# Patient Record
Sex: MALE | Race: BLACK OR AFRICAN AMERICAN | NOT HISPANIC OR LATINO | Employment: OTHER | URBAN - METROPOLITAN AREA
[De-identification: names, ages, dates, MRNs, and addresses within clinical notes are randomized per-mention and may not be internally consistent; named-entity substitution may affect disease eponyms.]

---

## 2023-10-18 ENCOUNTER — SPECIALTY PHARMACY (OUTPATIENT)
Dept: PHARMACY | Facility: CLINIC | Age: 68
End: 2023-10-18

## 2023-10-18 ENCOUNTER — PHARMACY VISIT (OUTPATIENT)
Dept: PHARMACY | Facility: CLINIC | Age: 68
End: 2023-10-18
Payer: MEDICARE

## 2023-10-18 PROCEDURE — RXMED WILLOW AMBULATORY MEDICATION CHARGE

## 2023-11-27 ENCOUNTER — SPECIALTY PHARMACY (OUTPATIENT)
Dept: PHARMACY | Facility: CLINIC | Age: 68
End: 2023-11-27

## 2023-11-28 ENCOUNTER — PHARMACY VISIT (OUTPATIENT)
Dept: PHARMACY | Facility: CLINIC | Age: 68
End: 2023-11-28
Payer: MEDICARE

## 2023-11-28 PROCEDURE — RXMED WILLOW AMBULATORY MEDICATION CHARGE

## 2023-12-19 PROCEDURE — RXMED WILLOW AMBULATORY MEDICATION CHARGE

## 2023-12-20 ENCOUNTER — PHARMACY VISIT (OUTPATIENT)
Dept: PHARMACY | Facility: CLINIC | Age: 68
End: 2023-12-20
Payer: MEDICARE

## 2023-12-21 ENCOUNTER — PHARMACY VISIT (OUTPATIENT)
Dept: PHARMACY | Facility: CLINIC | Age: 68
End: 2023-12-21

## 2024-01-04 RX ORDER — TADALAFIL 5 MG/1
TABLET ORAL
COMMUNITY
Start: 2023-10-05

## 2024-01-04 RX ORDER — MULTIVITAMIN
TABLET ORAL
COMMUNITY

## 2024-01-04 RX ORDER — METFORMIN HYDROCHLORIDE 500 MG/1
1 TABLET ORAL 2 TIMES DAILY
COMMUNITY
Start: 2020-06-15

## 2024-01-04 RX ORDER — SEMAGLUTIDE 1.34 MG/ML
0.25 INJECTION, SOLUTION SUBCUTANEOUS WEEKLY
COMMUNITY

## 2024-01-04 RX ORDER — CHOLECALCIFEROL (VITAMIN D3) 25 MCG
TABLET ORAL
COMMUNITY

## 2024-01-04 RX ORDER — TAMSULOSIN HYDROCHLORIDE 0.4 MG/1
1 CAPSULE ORAL DAILY
COMMUNITY
Start: 2020-06-15

## 2024-01-08 ENCOUNTER — SPECIALTY PHARMACY (OUTPATIENT)
Dept: PHARMACY | Facility: CLINIC | Age: 69
End: 2024-01-08

## 2024-01-08 PROBLEM — E66.9 OBESITY (BMI 30-39.9): Status: ACTIVE | Noted: 2024-01-08

## 2024-01-08 PROBLEM — E11.9 DIABETES MELLITUS TYPE II, NON INSULIN DEPENDENT (MULTI): Status: ACTIVE | Noted: 2024-01-08

## 2024-01-08 PROBLEM — G47.33 OSA (OBSTRUCTIVE SLEEP APNEA): Status: ACTIVE | Noted: 2024-01-08

## 2024-01-08 PROBLEM — E78.5 DYSLIPIDEMIA: Status: ACTIVE | Noted: 2024-01-08

## 2024-01-08 PROBLEM — I10 ESSENTIAL HYPERTENSION, BENIGN: Status: ACTIVE | Noted: 2024-01-08

## 2024-01-08 NOTE — PROGRESS NOTES
[facility] Card[facility]    Patient Name: Ned Veronica  Patient : 1955      Scribe Attestation  By signing my name below, ICarolina Scribe   attest that this documentation has been prepared under the direction and in the presence of Vikas Early DO.      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  The patient was last evaluated by me in 2023.  At that visit he was doing well and asked that he follow-up in 1 year. Outside blood work done on 11/3/23 showed lipid panel with an  LDL of 81, and  triglycerides of 72 while on Praluent 75 mg subQ every 2 weeks. CMP showed serum sodium of 139, serum potassium 3.6 and serum creatinine of 1.2. CBC showed a hemoglobin of 14.7. ECG done today showed sinus rhythm with a heart rate of 69 bpm. The patient reports that he has been feeling generally well from the cardiac standpoint. He denies any new chest pain, shortness of breath, palpitations and lightheadedness.  He states that he does not monitor his blood pressure at home. He states that he takes all of his medications as prescribed. During my exam, he was resting comfortably on the exam table.           Assessment/Plan:   1. 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  Outside lipid panel done on 11/3/23 showed an LDL of 81, and  triglycerides of 72 while on Praluent 75 mg subQ every 2 weeks.  Patient requesting to be transition to Repatha secondary to insurance coverage.  Will discontinue Praluent and start  Repatha 140 mg subcu every 2 weeks.  Please see lifestyle recommendations below.  Follow up in 1 year and sooner if necessary.      2. Hypertension  Patient has a history of hypertension which appears moderately controlled on exam today.  ECG done today showed sinus rhythm with a heart rate of 69 bpm.   He denies chest pain, headache or lightheadedness.  I will start him on amlodipine 2.5 mg daily.   He should continue his other antihypertensive medications.  She should monitor his blood pressure at home and call the clinic if his blood pressure is persistently elevated.   Continue risk factor modification.     3. Diabetes mellitus  Medication management per primary care provider.     4. Obstructive sleep apnea  Stable, patient on CPAP therapy.  Management as per PCP.     5. Obesity  Please see lifestyle recommendations below.    6. Lower extremity edema  The patient has trivial-1+ pitting edema on the right and 1+ on the left.  I discussed with him the importance of following a low-sodium heart healthy diet as well as weight loss, wearing compression stockings and elevating legs when seated.     Orders:  Discontinue Praluent and start Repatha 140 mg subcu every 2 weeks.  Start amlodipine 2.5 mg daily.   Follow-up in 1 year.    Lifestyle Recommendations  I recommend a whole-food plant-based diet, an eating pattern that encourages the consumption of unrefined plant foods (such as fruits, vegetables, tubers, whole grains, legumes, nuts and seeds) and discourages meats, dairy products, eggs and processed foods.     The AHA/ACC recommends that the patient consume a dietary pattern that emphasizes intake of vegetables, fruits, and whole grains; includes low-fat dairy products, poultry, fish, legumes, non-tropical vegetable oils, and nuts; and limits intake of sodium, sweets, sugar-sweetened beverages, and red meats.  Adapt this dietary pattern to appropriate calorie requirements (a 500-750 kcal/day deficit to loose  weight), personal and cultural food preferences, and nutrition therapy for other medical conditions (including diabetes).  Achieve this pattern by following plans such as the [place] Mediterranean, DASH dietary pattern, or AHA diet.     Engage in 2 hours and 30 minutes per week of moderate-intensity physical activity, or 1 hour and 15 minutes (75 minutes) per week of vigorous-intensity aerobic physical activity, or an equivalent combination of moderate and vigorous-intensity aerobic physical activity. Aerobic activity should be performed in episodes of at least 10 minutes preferably spread throughout the week.     Adhering to a heart healthy diet, regular exercise habits, avoidance of tobacco products, and maintenance of a healthy weight are crucial components of their heart disease risk reduction.     Any positive review of systems not specifically addressed in the office visit today should be evaluated and treated by the patients primary care physician or in an emergency department if necessary     Patient was notified that results from ordered tests will be called to the patient if it changes current management; it will otherwise be discussed at a future appointment and available on  [employer].     Thank you for allowing me to participate in the care of this patient.        This document was generated using the assistance of voice recognition software. If there are any errors of spelling, grammar, syntax, or meaning; please feel free to contact me directly for clarification.    Past Medical History:  He has a past medical history of Encounter for screening for malignant neoplasm of colon (06/15/2020), Flat foot (pes planus) (acquired), unspecified foot, Gastro-esophageal reflux disease without esophagitis, Obstructive sleep apnea (adult) (pediatric), Pain in right hip (08/15/2016), Personal history of colonic polyps, Personal history of other diseases of the digestive system, Personal history of other specified  conditions (06/14/2017), Personal history of peptic ulcer disease, Segmental and somatic dysfunction of lumbar region (08/15/2016), Segmental and somatic dysfunction of pelvic region (08/15/2016), Segmental and somatic dysfunction of sacral region (08/15/2016), and Segmental and somatic dysfunction of thoracic region (08/15/2016).    Past Surgical History:  He has a past surgical history that includes Other surgical history (06/26/2020); Other surgical history (06/26/2020); Other surgical history (06/26/2020); Other surgical history (06/26/2020); and Other surgical history (06/26/2020).      Social History:  He has no history on file for tobacco use, alcohol use, and drug use.    Family History:  No family history on file.     Allergies:  Patient has no allergy information on record.    Outpatient Medications:  Current Outpatient Medications   Medication Instructions    alirocumab 75 mg/mL pen injector INJECT 1 PEN (75 MG) UNDER THE SKIN ONCE EVERY 2 WEEKS    cholecalciferol (Vitamin D-3) 25 MCG (1000 UT) tablet oral    FreeStyle Vicky sensor system kit USE AS DIRECTED, CHANGE SENSOR EVERY 2 WEEKS    L. acidophilus/Bifid. animalis 32 billion cell capsule 1 capsule, oral, Daily    metFORMIN (Glucophage) 500 mg tablet 1 tablet, oral, 2 times daily    multivitamin tablet oral    Ozempic 0.25 mg, subcutaneous, Weekly    tadalafil (Cialis) 5 mg tablet Take one pill daily for urinary complaints    tamsulosin (Flomax) 0.4 mg 24 hr capsule 1 capsule, oral, Daily    ubidecarenone (COENZYME Q10, BULK, MISC) oral        ROS:  A 14 point review of systems was done and is negative other than as stated in HPI    Vitals:      6/15/2020     9:00 AM 6/24/2020     9:20 AM 7/29/2020    12:38 PM 1/19/2021     1:35 PM 7/20/2021    12:37 PM 1/20/2022    12:37 PM 1/19/2023    11:31 AM   Vitals   Systolic 120  128 120 122 142 126   Diastolic 80  70 62 70 84 80   Heart Rate 57  55 56 68 53 64   Resp   16   16    Height (in) 1.708 m (5'  "7.25\") 1.701 m (5' 6.97\") 1.708 m (5' 7.25\") 1.715 m (5' 7.5\") 1.715 m (5' 7.5\") 1.715 m (5' 7.5\") 1.715 m (5' 7.5\")   Weight (lb) 239.2 234.35 232 230 231.13 230 237   BMI 37.19 kg/m2 36.74 kg/m2 36.07 kg/m2 35.49 kg/m2 35.66 kg/m2 35.49 kg/m2 36.57 kg/m2   BSA (m2) 2.27 m2 2.24 m2 2.23 m2 2.23 m2 2.24 m2 2.23 m2 2.27 m2        Physical Exam:   Constitutional: Cooperative, in no acute distress, alert, appears stated age.  Skin: Skin color, texture, turgor normal. No rashes or lesions.  Head: Normocephalic. No masses, lesions, tenderness or abnormalities  Eyes: Extraocular movements are grossly intact.  Mouth and throat: Mucous membranes moist  Neck: Neck supple, no carotid bruits, no JVD  Respiratory: Lungs clear to auscultation, no wheezing or rhonchi, no use of accessory muscles  Chest wall: No scars, normal excursion with respiration  Cardiovascular: Regular rhythm without murmur, gallop, or rubs  Gastrointestinal: Abdomen soft, nontender. Bowel sounds normal. Obese  Musculoskeletal: Strength equal in upper extremities  Extremities: Trivial-1+ pitting edema on the right and 1+ on the left  Neurologic: Sensation grossly intact, alert and oriented x3      Intake/Output:   No intake/output data recorded.    Outpatient Medications  Current Outpatient Medications on File Prior to Visit   Medication Sig Dispense Refill    alirocumab 75 mg/mL pen injector INJECT 1 PEN (75 MG) UNDER THE SKIN ONCE EVERY 2 WEEKS 2 mL 11    cholecalciferol (Vitamin D-3) 25 MCG (1000 UT) tablet Take by mouth.      FreeStyle Vicky sensor system kit USE AS DIRECTED, CHANGE SENSOR EVERY 2 WEEKS 2 each 11    L. acidophilus/Bifid. animalis 32 billion cell capsule Take 1 capsule by mouth once daily.      metFORMIN (Glucophage) 500 mg tablet Take 1 tablet (500 mg) by mouth 2 times a day.      multivitamin tablet Take by mouth.      semaglutide (Ozempic) 0.25 mg or 0.5 mg(2 mg/1.5 mL) pen injector Inject 0.25 mg under the skin once a week.      " tadalafil (Cialis) 5 mg tablet Take one pill daily for urinary complaints      tamsulosin (Flomax) 0.4 mg 24 hr capsule Take 1 capsule (0.4 mg) by mouth once daily.      ubidecarenone (COENZYME Q10, BULK, MISC) Take by mouth.       No current facility-administered medications on file prior to visit.       Labs: (past 26 weeks)  No results found for this or any previous visit (from the past 4368 hour(s)).    ECG  No results found for this or any previous visit (from the past 4464 hour(s)).    Echocardiogram  No results found for this or any previous visit from the past 1095 days.      CV Studies:  EKG: No results found for this or any previous visit (from the past 4464 hour(s)).  Echocardiogram:   [place] [address]  Phone [phone] Fax [phone]    TRANSTHORACIC ECHOCARDIOGRAM REPORT      Patient Name:     CLAU NGUYEN Reading Physician:   [id number] Giorgio Martins MD  Study Date:       8/20/2020     Referring Physician: [id number] Satinder Huizar MD  MRN/PID:          [id number]      PCP:  Accession/Order#: [id number]  Department Location: [facility]  YOB: 1955     Fellow:  Gender:           M             Nurse:  Admit Date:       8/20/2020     Sonographer:         Debbie Hauser [facility]  Admission Status: Outpatient    Additional Staff:  Height:           170.18 cm     CC Report to:  Weight:           102.51 kg     Study Type:          Echocardiogram  BSA:              2.13 m2  Blood Pressure: 133 /81 mmHg    Diagnosis/ICD: R01.1-Cardiac murmur, unspecified; I50.1-Left ventricular failure  Indication:    Murmur Left ventricular failure  Procedure/CPT: Echo Complete w Full [id number]    Patient History:  Pertinent History: Murmur and Hyperlipidemia.    Study Detail: The following Echo studies were performed: 2D, M-Mode, Doppler and  color flow.      PHYSICIAN INTERPRETATION:  Left Ventricle: The left  ventricular systolic function is normal, with an estimated ejection fraction of 55%. There are no regional wall motion abnormalities. The left ventricular cavity size is normal. Spectral Doppler shows a normal pattern of left ventricular diastolic filling.  Left Atrium: The left atrium is normal in size.  Right Ventricle: The right ventricle is normal in size. There is low normal right ventricular systolic function.  Right Atrium: The right atrium is normal in size.  Aortic Valve: The aortic valve is trileaflet. There is no evidence of aortic valve regurgitation.  Mitral Valve: The mitral valve is normal in structure. There is trace mitral valve regurgitation.  Tricuspid Valve: The tricuspid valve is structurally normal. No evidence of tricuspid regurgitation.  Pulmonic Valve: The pulmonic valve is structurally normal. There is physiologic pulmonic valve regurgitation.  Pericardium: There is no pericardial effusion noted.  Aorta: The aortic root is normal.      CONCLUSIONS:  1. The left ventricular systolic function is normal with a 55% estimated ejection fraction.    QUANTITATIVE DATA SUMMARY:  2D MEASUREMENTS:  Normal Ranges:  Ao Root d:     2.60 cm   (2.0-3.7cm)  IVSd:          1.20 cm   (0.6-1.1cm)  LVPWd:         1.10 cm   (0.6-1.1cm)  LVIDd:         3.90 cm   (3.9-5.9cm)  LVIDs:         3.10 cm  LV Mass Index: 70.2 g/m2  LV % FS        20.5 %    LA VOLUME:  Normal Ranges:  LA Vol A4C:        39.3 ml    (22+/-6mL/m2)  LA Vol A2C:        51.8 ml  LA Vol BP:         46.6 ml  LA Vol Index A4C:  18.4ml/m2  LA Vol Index A2C:  24.3 ml/m2  LA Vol Index BP:   21.9 ml/m2  LA Area A4C:       16.0 cm2  LA Area A2C:       17.8 cm2  LA Major Axis A4C: 5.5 cm  LA Major Axis A2C: 5.2 cm    RA VOLUME BY A/L METHOD:  Normal Ranges:  RA Area A4C: 15.1 cm2    M-MODE MEASUREMENTS:  Normal Ranges:  Ao Root: 2.90 cm (2.0-3.7cm)  AoV Exc: 1.70 cm (1.5-2.5cm)  LAs:     2.50 cm (2.7-4.0cm)    AORTA MEASUREMENTS:  Normal Ranges:  AoV  Exc:     1.70 cm (1.5-2.5cm)  AoV Miracle,s:   2.60 cm (1.4-2.6cm)  Ao Sinus, s: 2.90 cm  Ao STJ, s:   2.70 cm  Asc Ao, d:   2.90 cm (2.1-3.4cm)    LV SYSTOLIC FUNCTION BY 2D PLANIMETRY (MOD):  Normal Ranges:  EF-A4C View: 48.5 % (>55%)  EF-A2C View: 50.5 %  EF-Biplane:  48.9 %    LV DIASTOLIC FUNCTION:  Normal Ranges:  MV Peak E:    0.81 m/s    (0.7-1.2 m/s)  MV Peak A:    0.50 m/s    (0.42-0.7 m/s)  E/A Ratio:    1.62        (1.0-2.2)  MV e'         0.11 m/s    (>8.0)  MV lateral e' 0.12 m/s  MV medial e'  0.10 m/s  MV A Dur:     142.00 msec  E/e' Ratio:   7.35        (<8.0)  LV IVRT:      84 msec     (<100ms)    MITRAL VALVE:  Normal Ranges:  MV DT: 274 msec (150-240msec)    MITRAL INSUFFICIENCY:  Normal Ranges:  MR VTI:       193.00 cm  MR Vmax:      488.00 cm/s  MR Alias Mustapha: 31.6 cm/s  MR Volume:    11.23 ml  MR Flow Rt:   28.40 ml/s  MR EROA:      0.06 cm2    AORTIC VALVE:  Normal Ranges:  LVOT Max Mustapha:  0.89 m/s (<1.1m/s)  LVOT VTI:      22.00 cm  LVOT Diameter: 2.00 cm  (1.8-2.4cm)    RIGHT VENTRICLE:  RV 1   3.7 cm  RV 2   2.8 cm  RV 3   7.0 cm  TAPSE: 18.6 mm  RV s'  0.12 m/s    TRICUSPID VALVE/RVSP:  Normal Ranges:  Peak TR Velocity: 2.11 m/s  RV Syst Pressure: 25.8 mmHg (< 30mmHg)  TV E Vmax:        0.56 m/s  (0.3-0.7m/s)  TV A Vmax:        0.31 m/s  IVC Diam:         2.20 cm      [id number] Giorgio Martins MD  Electronically signed on 8/21/2020 at 12:15:15 PM         Final     Stress Testing IMGRESULT(ILN0014:1:1825):   NM CARDIAC STRESS REST (MYOCARDIAL PERFUSION MIBI) 08/07/2020    Narrative  MRN: [id number]  Patient Name: CLAU NGUYEN    STUDY:  CARDIAC STRESS/REST INJECTION;  8/7/2020 9:30 am    INDICATION:  Abnormal EKG with multiple CAD risk factors.    COMPARISON:  None.    ACCESSION NUMBER(S):  [id number]    ORDERING CLINICIAN:  EMMA PALOMO    TECHNIQUE:  DIVISION OF NUCLEAR MEDICINE  PHARMACOLOGIC STRESS MYOCARDIAL PERFUSION SCAN, ONE DAY PROTOCOL    The patient received an intravenous dose of  10.6  mCi of Tc-99m  tetrofosmin and resting emission tomographic (SPECT) images of the  myocardium were acquired. The patient then received an intravenous  infusion of 0.4mg regadenoson (Lexiscan) followed by an additional  dose of  34.4 mCi of Tc-99m tetrofosmin. Stress phase SPECT images of  the myocardium were then acquired. These included ECG-gated images to  assess and quantify ventricular function.    FINDINGS:  Stress and rest images both demonstrate a normal distribution of  perfusion throughout all LV segments with no sign of ischemia.    ECG-gated images demonstrate normal LV size and mildly reduced  myocardial contractility with an LV ejection fraction of   46 %  (normal above 50 percent).    Impression  Normal stress myocardial perfusion imaging in response to  pharmacologic stress. Mildly reduced left ventricular systolic  function in post-stress gated imaging.    Cardiac Catheterization: No results found for this or any previous visit from the past 1825 days.  No results found for this or any previous visit from the past 3650 days.     Cardiac Scoring: No results found for this or any previous visit from the past 1825 days.    AAA : No results found for this or any previous visit from the past 1825 days.    OTHER: No results found for this or any previous visit from the past 1825 days.    LAST IMAGING RESULTS  ELECTROCARDIOGRAM RHYTHM STRIP  Ordered by an unspecified provider.    Problem List Items Addressed This Visit       Dyslipidemia - Primary    Relevant Medications    evolocumab (Repatha SureClick) 140 mg/mL injection    amLODIPine (Norvasc) 2.5 mg tablet    Other Relevant Orders    ECG 12 Lead (Completed)    Follow Up In Cardiology    Essential hypertension, benign    Relevant Medications    evolocumab (Repatha SureClick) 140 mg/mL injection    amLODIPine (Norvasc) 2.5 mg tablet    Other Relevant Orders    ECG 12 Lead (Completed)    Follow Up In Cardiology    Diabetes mellitus type II, non insulin  dependent (CMS/HCC)    Relevant Medications    evolocumab (Repatha SureClick) 140 mg/mL injection    amLODIPine (Norvasc) 2.5 mg tablet    Other Relevant Orders    ECG 12 Lead (Completed)    Follow Up In Cardiology    LAURA (obstructive sleep apnea)    Relevant Medications    evolocumab (Repatha SureClick) 140 mg/mL injection    amLODIPine (Norvasc) 2.5 mg tablet    Other Relevant Orders    ECG 12 Lead (Completed)    Follow Up In Cardiology    Obesity (BMI 30-39.9)    Relevant Medications    evolocumab (Repatha SureClick) 140 mg/mL injection    amLODIPine (Norvasc) 2.5 mg tablet    Other Relevant Orders    ECG 12 Lead (Completed)    Follow Up In Cardiology    Bilateral lower extremity edema              Vikas Early DO, FACC, FACOI

## 2024-01-12 ENCOUNTER — OFFICE VISIT (OUTPATIENT)
Dept: CARDIOLOGY | Facility: CLINIC | Age: 69
End: 2024-01-12
Payer: COMMERCIAL

## 2024-01-12 VITALS
WEIGHT: 225 LBS | SYSTOLIC BLOOD PRESSURE: 140 MMHG | HEART RATE: 69 BPM | HEIGHT: 67 IN | DIASTOLIC BLOOD PRESSURE: 92 MMHG | BODY MASS INDEX: 35.31 KG/M2

## 2024-01-12 DIAGNOSIS — R60.0 BILATERAL LOWER EXTREMITY EDEMA: ICD-10-CM

## 2024-01-12 DIAGNOSIS — E66.9 OBESITY (BMI 30-39.9): ICD-10-CM

## 2024-01-12 DIAGNOSIS — G47.33 OSA (OBSTRUCTIVE SLEEP APNEA): ICD-10-CM

## 2024-01-12 DIAGNOSIS — I10 ESSENTIAL HYPERTENSION, BENIGN: ICD-10-CM

## 2024-01-12 DIAGNOSIS — E78.5 DYSLIPIDEMIA: Primary | ICD-10-CM

## 2024-01-12 DIAGNOSIS — E11.9 DIABETES MELLITUS TYPE II, NON INSULIN DEPENDENT (MULTI): ICD-10-CM

## 2024-01-12 PROCEDURE — 3077F SYST BP >= 140 MM HG: CPT | Performed by: INTERNAL MEDICINE

## 2024-01-12 PROCEDURE — 3080F DIAST BP >= 90 MM HG: CPT | Performed by: INTERNAL MEDICINE

## 2024-01-12 PROCEDURE — 1159F MED LIST DOCD IN RCRD: CPT | Performed by: INTERNAL MEDICINE

## 2024-01-12 PROCEDURE — 93000 ELECTROCARDIOGRAM COMPLETE: CPT | Performed by: INTERNAL MEDICINE

## 2024-01-12 PROCEDURE — 4010F ACE/ARB THERAPY RXD/TAKEN: CPT | Performed by: INTERNAL MEDICINE

## 2024-01-12 PROCEDURE — 99213 OFFICE O/P EST LOW 20 MIN: CPT | Performed by: INTERNAL MEDICINE

## 2024-01-12 PROCEDURE — 1036F TOBACCO NON-USER: CPT | Performed by: INTERNAL MEDICINE

## 2024-01-12 RX ORDER — LANCETS 30 GAUGE
EACH MISCELLANEOUS
COMMUNITY
Start: 2023-05-03

## 2024-01-12 RX ORDER — BLOOD-GLUCOSE METER
EACH MISCELLANEOUS EVERY 12 HOURS
COMMUNITY
Start: 2023-05-03

## 2024-01-12 RX ORDER — AMLODIPINE BESYLATE 2.5 MG/1
2.5 TABLET ORAL DAILY
Qty: 90 TABLET | Refills: 3 | Status: SHIPPED | OUTPATIENT
Start: 2024-01-12 | End: 2025-01-11

## 2024-01-12 RX ORDER — HYDROCHLOROTHIAZIDE 25 MG/1
25 TABLET ORAL DAILY
COMMUNITY

## 2024-01-12 RX ORDER — ALIROCUMAB 75 MG/ML
INJECTION, SOLUTION SUBCUTANEOUS
COMMUNITY

## 2024-01-12 RX ORDER — LOSARTAN POTASSIUM 50 MG/1
100 TABLET ORAL DAILY
COMMUNITY

## 2024-01-16 ENCOUNTER — SPECIALTY PHARMACY (OUTPATIENT)
Dept: PHARMACY | Facility: CLINIC | Age: 69
End: 2024-01-16

## 2024-01-16 DIAGNOSIS — G47.33 OSA (OBSTRUCTIVE SLEEP APNEA): ICD-10-CM

## 2024-01-16 DIAGNOSIS — I10 ESSENTIAL HYPERTENSION, BENIGN: ICD-10-CM

## 2024-01-16 DIAGNOSIS — E78.5 DYSLIPIDEMIA: ICD-10-CM

## 2024-01-16 DIAGNOSIS — E11.9 DIABETES MELLITUS TYPE II, NON INSULIN DEPENDENT (MULTI): ICD-10-CM

## 2024-01-16 DIAGNOSIS — E66.9 OBESITY (BMI 30-39.9): ICD-10-CM

## 2024-01-16 PROCEDURE — RXMED WILLOW AMBULATORY MEDICATION CHARGE

## 2024-01-16 RX ORDER — ALIROCUMAB 75 MG/ML
INJECTION, SOLUTION SUBCUTANEOUS
Qty: 2 ML | Refills: 11 | Status: CANCELLED | OUTPATIENT
Start: 2024-01-16 | End: 2025-01-14

## 2024-01-18 ENCOUNTER — PHARMACY VISIT (OUTPATIENT)
Dept: PHARMACY | Facility: CLINIC | Age: 69
End: 2024-01-18
Payer: MEDICARE

## 2024-01-19 ENCOUNTER — PHARMACY VISIT (OUTPATIENT)
Dept: PHARMACY | Facility: CLINIC | Age: 69
End: 2024-01-19
Payer: MEDICARE

## 2024-02-14 ENCOUNTER — SPECIALTY PHARMACY (OUTPATIENT)
Dept: PHARMACY | Facility: CLINIC | Age: 69
End: 2024-02-14

## 2024-02-21 ENCOUNTER — SPECIALTY PHARMACY (OUTPATIENT)
Dept: PHARMACY | Facility: CLINIC | Age: 69
End: 2024-02-21

## 2024-02-21 PROCEDURE — RXMED WILLOW AMBULATORY MEDICATION CHARGE

## 2024-02-22 ENCOUNTER — PHARMACY VISIT (OUTPATIENT)
Dept: PHARMACY | Facility: CLINIC | Age: 69
End: 2024-02-22
Payer: MEDICARE

## 2024-03-23 ENCOUNTER — PHARMACY VISIT (OUTPATIENT)
Dept: PHARMACY | Facility: CLINIC | Age: 69
End: 2024-03-23
Payer: MEDICARE

## 2024-03-23 PROCEDURE — RXMED WILLOW AMBULATORY MEDICATION CHARGE

## 2024-04-15 ENCOUNTER — SPECIALTY PHARMACY (OUTPATIENT)
Dept: PHARMACY | Facility: CLINIC | Age: 69
End: 2024-04-15

## 2024-04-20 ENCOUNTER — PHARMACY VISIT (OUTPATIENT)
Dept: PHARMACY | Facility: CLINIC | Age: 69
End: 2024-04-20
Payer: MEDICARE

## 2024-04-20 PROCEDURE — RXMED WILLOW AMBULATORY MEDICATION CHARGE

## 2024-05-11 PROCEDURE — RXMED WILLOW AMBULATORY MEDICATION CHARGE

## 2024-05-17 ENCOUNTER — PHARMACY VISIT (OUTPATIENT)
Dept: PHARMACY | Facility: CLINIC | Age: 69
End: 2024-05-17
Payer: MEDICARE

## 2024-05-17 PROCEDURE — RXMED WILLOW AMBULATORY MEDICATION CHARGE

## 2024-06-08 PROCEDURE — RXMED WILLOW AMBULATORY MEDICATION CHARGE

## 2024-06-18 ENCOUNTER — PHARMACY VISIT (OUTPATIENT)
Dept: PHARMACY | Facility: CLINIC | Age: 69
End: 2024-06-18
Payer: MEDICARE

## 2024-07-08 PROCEDURE — RXMED WILLOW AMBULATORY MEDICATION CHARGE

## 2024-07-09 ENCOUNTER — PHARMACY VISIT (OUTPATIENT)
Dept: PHARMACY | Facility: CLINIC | Age: 69
End: 2024-07-09
Payer: MEDICARE

## 2024-07-29 PROCEDURE — RXMED WILLOW AMBULATORY MEDICATION CHARGE

## 2024-07-30 ENCOUNTER — PHARMACY VISIT (OUTPATIENT)
Dept: PHARMACY | Facility: CLINIC | Age: 69
End: 2024-07-30
Payer: MEDICARE

## 2024-08-16 PROCEDURE — RXMED WILLOW AMBULATORY MEDICATION CHARGE

## 2024-08-19 ENCOUNTER — PHARMACY VISIT (OUTPATIENT)
Dept: PHARMACY | Facility: CLINIC | Age: 69
End: 2024-08-19
Payer: MEDICARE

## 2024-09-03 PROCEDURE — RXMED WILLOW AMBULATORY MEDICATION CHARGE

## 2024-09-05 ENCOUNTER — PHARMACY VISIT (OUTPATIENT)
Dept: PHARMACY | Facility: CLINIC | Age: 69
End: 2024-09-05
Payer: MEDICARE

## 2024-09-06 ENCOUNTER — PHARMACY VISIT (OUTPATIENT)
Dept: PHARMACY | Facility: CLINIC | Age: 69
End: 2024-09-06
Payer: MEDICARE

## 2024-09-06 PROCEDURE — RXMED WILLOW AMBULATORY MEDICATION CHARGE

## 2024-09-26 PROCEDURE — RXMED WILLOW AMBULATORY MEDICATION CHARGE

## 2024-10-07 ENCOUNTER — PHARMACY VISIT (OUTPATIENT)
Dept: PHARMACY | Facility: CLINIC | Age: 69
End: 2024-10-07
Payer: MEDICARE

## 2024-10-07 PROCEDURE — RXMED WILLOW AMBULATORY MEDICATION CHARGE

## 2024-10-28 PROCEDURE — RXMED WILLOW AMBULATORY MEDICATION CHARGE

## 2024-10-29 ENCOUNTER — PHARMACY VISIT (OUTPATIENT)
Dept: PHARMACY | Facility: CLINIC | Age: 69
End: 2024-10-29
Payer: MEDICARE

## 2024-11-25 PROCEDURE — RXMED WILLOW AMBULATORY MEDICATION CHARGE

## 2024-11-27 ENCOUNTER — PHARMACY VISIT (OUTPATIENT)
Dept: PHARMACY | Facility: CLINIC | Age: 69
End: 2024-11-27
Payer: MEDICARE

## 2024-12-22 ENCOUNTER — PHARMACY VISIT (OUTPATIENT)
Dept: PHARMACY | Facility: CLINIC | Age: 69
End: 2024-12-22
Payer: MEDICARE

## 2024-12-22 PROCEDURE — RXMED WILLOW AMBULATORY MEDICATION CHARGE

## 2025-01-13 ENCOUNTER — LAB (OUTPATIENT)
Dept: LAB | Facility: LAB | Age: 70
End: 2025-01-13
Payer: COMMERCIAL

## 2025-01-13 ENCOUNTER — APPOINTMENT (OUTPATIENT)
Dept: CARDIOLOGY | Facility: CLINIC | Age: 70
End: 2025-01-13
Payer: COMMERCIAL

## 2025-01-13 VITALS
HEIGHT: 67 IN | SYSTOLIC BLOOD PRESSURE: 122 MMHG | HEART RATE: 54 BPM | DIASTOLIC BLOOD PRESSURE: 62 MMHG | WEIGHT: 215 LBS | BODY MASS INDEX: 33.74 KG/M2

## 2025-01-13 DIAGNOSIS — R60.0 BILATERAL LOWER EXTREMITY EDEMA: ICD-10-CM

## 2025-01-13 DIAGNOSIS — I10 ESSENTIAL HYPERTENSION, BENIGN: ICD-10-CM

## 2025-01-13 DIAGNOSIS — E78.5 DYSLIPIDEMIA: ICD-10-CM

## 2025-01-13 DIAGNOSIS — G47.33 OSA (OBSTRUCTIVE SLEEP APNEA): ICD-10-CM

## 2025-01-13 DIAGNOSIS — E66.9 OBESITY (BMI 30-39.9): ICD-10-CM

## 2025-01-13 DIAGNOSIS — E11.9 DIABETES MELLITUS TYPE II, NON INSULIN DEPENDENT (MULTI): ICD-10-CM

## 2025-01-13 DIAGNOSIS — E78.5 DYSLIPIDEMIA: Primary | ICD-10-CM

## 2025-01-13 LAB
ALBUMIN SERPL BCP-MCNC: 4.4 G/DL (ref 3.4–5)
ALP SERPL-CCNC: 56 U/L (ref 33–136)
ALT SERPL W P-5'-P-CCNC: 11 U/L (ref 10–52)
ANION GAP SERPL CALC-SCNC: 13 MMOL/L (ref 10–20)
AST SERPL W P-5'-P-CCNC: 13 U/L (ref 9–39)
ATRIAL RATE: 54 BPM
BILIRUB SERPL-MCNC: 0.5 MG/DL (ref 0–1.2)
BUN SERPL-MCNC: 12 MG/DL (ref 6–23)
CALCIUM SERPL-MCNC: 9.5 MG/DL (ref 8.6–10.3)
CHLORIDE SERPL-SCNC: 100 MMOL/L (ref 98–107)
CHOLEST SERPL-MCNC: 192 MG/DL (ref 0–199)
CHOLESTEROL/HDL RATIO: 3.5
CO2 SERPL-SCNC: 31 MMOL/L (ref 21–32)
CREAT SERPL-MCNC: 1.11 MG/DL (ref 0.5–1.3)
EGFRCR SERPLBLD CKD-EPI 2021: 73 ML/MIN/1.73M*2
ERYTHROCYTE [DISTWIDTH] IN BLOOD BY AUTOMATED COUNT: 14.2 % (ref 11.5–14.5)
GLUCOSE SERPL-MCNC: 127 MG/DL (ref 74–99)
HCT VFR BLD AUTO: 50.2 % (ref 41–52)
HDLC SERPL-MCNC: 54.8 MG/DL
HGB BLD-MCNC: 15.7 G/DL (ref 13.5–17.5)
LDLC SERPL CALC-MCNC: 116 MG/DL
MAGNESIUM SERPL-MCNC: 2.17 MG/DL (ref 1.6–2.4)
MCH RBC QN AUTO: 28.1 PG (ref 26–34)
MCHC RBC AUTO-ENTMCNC: 31.3 G/DL (ref 32–36)
MCV RBC AUTO: 90 FL (ref 80–100)
NON HDL CHOLESTEROL: 137 MG/DL (ref 0–149)
NRBC BLD-RTO: 0 /100 WBCS (ref 0–0)
P AXIS: 56 DEGREES
P OFFSET: 190 MS
P ONSET: 134 MS
PLATELET # BLD AUTO: 411 X10*3/UL (ref 150–450)
POTASSIUM SERPL-SCNC: 3.7 MMOL/L (ref 3.5–5.3)
PR INTERVAL: 176 MS
PROT SERPL-MCNC: 6.9 G/DL (ref 6.4–8.2)
Q ONSET: 222 MS
QRS COUNT: 9 BEATS
QRS DURATION: 70 MS
QT INTERVAL: 544 MS
QTC CALCULATION(BAZETT): 515 MS
QTC FREDERICIA: 524 MS
R AXIS: 54 DEGREES
RBC # BLD AUTO: 5.59 X10*6/UL (ref 4.5–5.9)
SODIUM SERPL-SCNC: 140 MMOL/L (ref 136–145)
T AXIS: -8 DEGREES
T OFFSET: 494 MS
TRIGL SERPL-MCNC: 107 MG/DL (ref 0–149)
VENTRICULAR RATE: 54 BPM
VLDL: 21 MG/DL (ref 0–40)
WBC # BLD AUTO: 4.4 X10*3/UL (ref 4.4–11.3)

## 2025-01-13 PROCEDURE — 93005 ELECTROCARDIOGRAM TRACING: CPT | Performed by: INTERNAL MEDICINE

## 2025-01-13 PROCEDURE — 80053 COMPREHEN METABOLIC PANEL: CPT

## 2025-01-13 PROCEDURE — 3074F SYST BP LT 130 MM HG: CPT | Performed by: INTERNAL MEDICINE

## 2025-01-13 PROCEDURE — 3078F DIAST BP <80 MM HG: CPT | Performed by: INTERNAL MEDICINE

## 2025-01-13 PROCEDURE — 4010F ACE/ARB THERAPY RXD/TAKEN: CPT | Performed by: INTERNAL MEDICINE

## 2025-01-13 PROCEDURE — 99213 OFFICE O/P EST LOW 20 MIN: CPT | Performed by: INTERNAL MEDICINE

## 2025-01-13 PROCEDURE — 3008F BODY MASS INDEX DOCD: CPT | Performed by: INTERNAL MEDICINE

## 2025-01-13 PROCEDURE — 85027 COMPLETE CBC AUTOMATED: CPT

## 2025-01-13 PROCEDURE — 1036F TOBACCO NON-USER: CPT | Performed by: INTERNAL MEDICINE

## 2025-01-13 PROCEDURE — 1159F MED LIST DOCD IN RCRD: CPT | Performed by: INTERNAL MEDICINE

## 2025-01-13 PROCEDURE — 83735 ASSAY OF MAGNESIUM: CPT

## 2025-01-13 PROCEDURE — 80061 LIPID PANEL: CPT

## 2025-01-13 PROCEDURE — 99213 OFFICE O/P EST LOW 20 MIN: CPT | Mod: 25 | Performed by: INTERNAL MEDICINE

## 2025-01-13 PROCEDURE — 93010 ELECTROCARDIOGRAM REPORT: CPT | Performed by: INTERNAL MEDICINE

## 2025-01-13 RX ORDER — ASPIRIN 81 MG/1
81 TABLET ORAL DAILY
COMMUNITY

## 2025-01-14 ENCOUNTER — TELEPHONE (OUTPATIENT)
Dept: CARDIOLOGY | Facility: HOSPITAL | Age: 70
End: 2025-01-14
Payer: COMMERCIAL

## 2025-01-14 DIAGNOSIS — E78.5 DYSLIPIDEMIA: Primary | ICD-10-CM

## 2025-01-14 RX ORDER — EZETIMIBE 10 MG/1
10 TABLET ORAL DAILY
Qty: 90 TABLET | Refills: 3 | Status: SHIPPED | OUTPATIENT
Start: 2025-01-14 | End: 2026-01-14

## 2025-01-14 NOTE — TELEPHONE ENCOUNTER
1/14/25  1008  Called lipid panel results to patient and plan for adding Zetia; also inquired if patient taking  Repatha. Patient verbalized understanding of results and is agreeable to plan; patient also reported he is taking Repatha.    New Rx for Zetia sent for approval.        ----- Message from Vikas Early sent at 1/13/2025  1:49 PM EST -----  Cholesterol is suboptimally controlled with an LDL cholesterol 116.  Please make sure patient is taking Repatha as prescribed.  Patient with a reported history of statin intolerance.  Please add Zetia 10 mg daily for additional LDL reduction.

## 2025-01-15 DIAGNOSIS — E11.9 DIABETES MELLITUS TYPE II, NON INSULIN DEPENDENT (MULTI): ICD-10-CM

## 2025-01-15 DIAGNOSIS — E66.9 OBESITY (BMI 30-39.9): ICD-10-CM

## 2025-01-15 DIAGNOSIS — E78.5 DYSLIPIDEMIA: ICD-10-CM

## 2025-01-15 DIAGNOSIS — I10 ESSENTIAL HYPERTENSION, BENIGN: ICD-10-CM

## 2025-01-15 DIAGNOSIS — G47.33 OSA (OBSTRUCTIVE SLEEP APNEA): ICD-10-CM

## 2025-01-15 RX ORDER — EVOLOCUMAB 140 MG/ML
140 INJECTION, SOLUTION SUBCUTANEOUS
Qty: 2 EACH | Refills: 11 | Status: CANCELLED | OUTPATIENT
Start: 2025-01-15

## 2025-01-20 PROCEDURE — RXMED WILLOW AMBULATORY MEDICATION CHARGE

## 2025-01-20 RX ORDER — EVOLOCUMAB 140 MG/ML
140 INJECTION, SOLUTION SUBCUTANEOUS
Qty: 2 EACH | Refills: 11 | Status: SHIPPED | OUTPATIENT
Start: 2025-01-20

## 2025-01-24 ENCOUNTER — TELEPHONE (OUTPATIENT)
Dept: CARDIOLOGY | Facility: HOSPITAL | Age: 70
End: 2025-01-24
Payer: COMMERCIAL

## 2025-01-24 NOTE — TELEPHONE ENCOUNTER
Patient called into office asking for clarification on medication they were to start and questions regarding blood work. Asks to speak with care team

## 2025-01-25 ENCOUNTER — PHARMACY VISIT (OUTPATIENT)
Dept: PHARMACY | Facility: CLINIC | Age: 70
End: 2025-01-25
Payer: COMMERCIAL

## 2025-01-29 NOTE — TELEPHONE ENCOUNTER
1/29/25  1240  Called patient; no answer. Left voice message for patient to return call to discuss medication and lab questions.

## 2025-01-30 ENCOUNTER — TELEPHONE (OUTPATIENT)
Dept: CARDIOLOGY | Facility: HOSPITAL | Age: 70
End: 2025-01-30
Payer: COMMERCIAL

## 2025-01-30 NOTE — TELEPHONE ENCOUNTER
1/30/25  1217  Returned call to patient who had questions on his Repatha and Zetia.    Clarified for patient that patient's Repatha prior authorization has been approved.    Patient did inform nurse he wanted to hold off on started the Zetia; he wants to try diet and exercise before starting the medication.    Informed patient nurse would make note of this and update his medication profile.    Patient verbalized understanding.

## 2025-02-15 ENCOUNTER — PHARMACY VISIT (OUTPATIENT)
Dept: PHARMACY | Facility: CLINIC | Age: 70
End: 2025-02-15
Payer: COMMERCIAL

## 2025-02-15 PROCEDURE — RXMED WILLOW AMBULATORY MEDICATION CHARGE

## 2025-03-15 ENCOUNTER — PHARMACY VISIT (OUTPATIENT)
Dept: PHARMACY | Facility: CLINIC | Age: 70
End: 2025-03-15
Payer: COMMERCIAL

## 2025-03-15 PROCEDURE — RXMED WILLOW AMBULATORY MEDICATION CHARGE

## 2025-04-12 PROCEDURE — RXMED WILLOW AMBULATORY MEDICATION CHARGE

## 2025-04-14 ENCOUNTER — PHARMACY VISIT (OUTPATIENT)
Dept: PHARMACY | Facility: CLINIC | Age: 70
End: 2025-04-14
Payer: COMMERCIAL

## 2025-05-05 PROCEDURE — RXMED WILLOW AMBULATORY MEDICATION CHARGE

## 2025-05-06 ENCOUNTER — PHARMACY VISIT (OUTPATIENT)
Dept: PHARMACY | Facility: CLINIC | Age: 70
End: 2025-05-06
Payer: COMMERCIAL

## 2025-06-02 PROCEDURE — RXMED WILLOW AMBULATORY MEDICATION CHARGE

## 2025-06-03 ENCOUNTER — PHARMACY VISIT (OUTPATIENT)
Dept: PHARMACY | Facility: CLINIC | Age: 70
End: 2025-06-03
Payer: COMMERCIAL

## 2025-07-01 ENCOUNTER — PHARMACY VISIT (OUTPATIENT)
Dept: PHARMACY | Facility: CLINIC | Age: 70
End: 2025-07-01
Payer: COMMERCIAL

## 2025-07-01 PROCEDURE — RXMED WILLOW AMBULATORY MEDICATION CHARGE
